# Patient Record
Sex: MALE | URBAN - METROPOLITAN AREA
[De-identification: names, ages, dates, MRNs, and addresses within clinical notes are randomized per-mention and may not be internally consistent; named-entity substitution may affect disease eponyms.]

---

## 2024-01-26 ENCOUNTER — OFFICE VISIT (OUTPATIENT)
Dept: URBAN - METROPOLITAN AREA CLINIC 111 | Facility: CLINIC | Age: 44
End: 2024-01-26
Payer: COMMERCIAL

## 2024-01-26 VITALS
BODY MASS INDEX: 30.76 KG/M2 | HEIGHT: 67 IN | TEMPERATURE: 97.7 F | DIASTOLIC BLOOD PRESSURE: 91 MMHG | WEIGHT: 196 LBS | HEART RATE: 61 BPM | SYSTOLIC BLOOD PRESSURE: 145 MMHG

## 2024-01-26 DIAGNOSIS — R19.5 LOOSE STOOLS: ICD-10-CM

## 2024-01-26 DIAGNOSIS — R19.4 ALTERED BOWEL HABITS: ICD-10-CM

## 2024-01-26 DIAGNOSIS — R15.0 INCOMPLETE DEFECATION: ICD-10-CM

## 2024-01-26 PROCEDURE — 99203 OFFICE O/P NEW LOW 30 MIN: CPT | Performed by: PHYSICIAN ASSISTANT

## 2024-01-26 NOTE — HPI-TODAY'S VISIT:
45 y/o male here w/ diarrhea. Reports wife told him his BMs are not normal.  Pt has a stool twice a day. Stool is usually soft but can be loose or hard. No blood in the stool. He may have one day of diarrhea per week. Watery stool that day and just 1-2 times. He also reports increased gas. Having this intermittent diarrhea for over a year. No increase in frequency in diarrhea. Reports diet is not great and that he eats a lot of fast food. Also drinks soda. Diarrhea may be related to food but he is not tracking what he eats and when the diarrhea starts. He will feel an incomplete sense of evacuation more recently too. He reports increased stress in the last 1.5 years.  No abd pain, cramping, or bloating.  No oil in stool or foul-smelling stool.  No weight loss. No N/V. No FH of GI cancer. Social ETOH. No tobacco. No previous scopes.

## 2024-01-26 NOTE — PHYSICAL EXAM HENT:
normocephalic, atraumatic, Face, Face within normal limits, Ears, External ears within normal limits, Nose/Nasopharynx, External nose  normal appearance, Mouth and Throat, Oral cavity appearance normal, Lips, Appearance normal

## 2024-03-27 ENCOUNTER — LAB (OUTPATIENT)
Dept: URBAN - METROPOLITAN AREA CLINIC 111 | Facility: CLINIC | Age: 44
End: 2024-03-27

## 2024-03-27 ENCOUNTER — OV EP (OUTPATIENT)
Dept: URBAN - METROPOLITAN AREA CLINIC 111 | Facility: CLINIC | Age: 44
End: 2024-03-27
Payer: COMMERCIAL

## 2024-03-27 VITALS
BODY MASS INDEX: 30.67 KG/M2 | WEIGHT: 195.4 LBS | SYSTOLIC BLOOD PRESSURE: 152 MMHG | TEMPERATURE: 98.1 F | HEART RATE: 68 BPM | DIASTOLIC BLOOD PRESSURE: 96 MMHG | HEIGHT: 67 IN

## 2024-03-27 DIAGNOSIS — R19.5 LOOSE STOOLS: ICD-10-CM

## 2024-03-27 DIAGNOSIS — R19.4 ALTERED BOWEL HABITS: ICD-10-CM

## 2024-03-27 PROCEDURE — 99213 OFFICE O/P EST LOW 20 MIN: CPT | Performed by: PHYSICIAN ASSISTANT

## 2024-03-27 NOTE — HPI-TODAY'S VISIT:
43 y/o male here to follow up on changes in bowel habits. Seen by me on 1/26. Recommended starting benefiber and eating healthier. Also recommended food diary.  He states he is feeling similarly. He did try Benefiber but was not consistent with it. When he took it stool was more solid o/w it was still loose. Pt has been having stools 2-3 times a day. No further constipation. No dietary changes. Stool over a year ago was formed.  Previous: Pt has a stool twice a day. Stool is usually soft but can be loose or hard. No blood in the stool. He may have one day of diarrhea per week. Watery stool that day and just 1-2 times. He also reports increased gas. Having this intermittent diarrhea for over a year. No increase in frequency in diarrhea. Reports diet is not great and that he eats a lot of fast food. Also drinks soda. Diarrhea may be related to food but he is not tracking what he eats and when the diarrhea starts.He will feel an incomplete sense of evacuation more recently too.He reports increased stress in the last 1.5 years.No abd pain, cramping, or bloating. No oil in stool or foul-smelling stool. No weight loss. No N/V. No FH of GI cancer. Social ETOH. No tobacco. No previous scopes.

## 2024-04-29 ENCOUNTER — COLON (OUTPATIENT)
Dept: URBAN - METROPOLITAN AREA SURGERY CENTER 15 | Facility: SURGERY CENTER | Age: 44
End: 2024-04-29

## 2024-05-16 ENCOUNTER — TELEPHONE ENCOUNTER (OUTPATIENT)
Dept: URBAN - METROPOLITAN AREA CLINIC 12 | Facility: CLINIC | Age: 44
End: 2024-05-16

## 2024-05-30 ENCOUNTER — DASHBOARD ENCOUNTERS (OUTPATIENT)
Age: 44
End: 2024-05-30

## 2024-05-30 ENCOUNTER — OFFICE VISIT (OUTPATIENT)
Dept: URBAN - METROPOLITAN AREA CLINIC 111 | Facility: CLINIC | Age: 44
End: 2024-05-30
Payer: COMMERCIAL

## 2024-05-30 VITALS
HEART RATE: 78 BPM | BODY MASS INDEX: 31.01 KG/M2 | DIASTOLIC BLOOD PRESSURE: 91 MMHG | WEIGHT: 197.6 LBS | HEIGHT: 67 IN | SYSTOLIC BLOOD PRESSURE: 138 MMHG | TEMPERATURE: 97.9 F

## 2024-05-30 DIAGNOSIS — D12.6 HIGH GRADE DYSPLASIA IN COLONIC ADENOMA: ICD-10-CM

## 2024-05-30 DIAGNOSIS — R19.7 DIARRHEA, UNSPECIFIED: ICD-10-CM

## 2024-05-30 PROCEDURE — 99213 OFFICE O/P EST LOW 20 MIN: CPT | Performed by: PHYSICIAN ASSISTANT

## 2024-06-12 ENCOUNTER — LAB OUTSIDE AN ENCOUNTER (OUTPATIENT)
Dept: URBAN - METROPOLITAN AREA CLINIC 23 | Facility: CLINIC | Age: 44
End: 2024-06-12

## 2024-06-14 ENCOUNTER — TELEPHONE ENCOUNTER (OUTPATIENT)
Dept: URBAN - METROPOLITAN AREA CLINIC 111 | Facility: CLINIC | Age: 44
End: 2024-06-14

## 2024-06-14 LAB — PANCREATICELASTASE ELISA, STOOL: (no result)

## 2024-06-14 RX ORDER — PANCRELIPASE 36000; 180000; 114000 [USP'U]/1; [USP'U]/1; [USP'U]/1
1 CAPSULE WITH EACH MEAL AND 1 WITH SNACKS CAPSULE, DELAYED RELEASE PELLETS ORAL AS DIRECTED
Qty: 120 | Refills: 2 | OUTPATIENT
Start: 2024-06-14 | End: 2024-09-12

## 2024-08-21 ENCOUNTER — OFFICE VISIT (OUTPATIENT)
Dept: URBAN - METROPOLITAN AREA CLINIC 111 | Facility: CLINIC | Age: 44
End: 2024-08-21

## 2024-09-06 ENCOUNTER — TELEPHONE ENCOUNTER (OUTPATIENT)
Dept: URBAN - METROPOLITAN AREA CLINIC 111 | Facility: CLINIC | Age: 44
End: 2024-09-06

## 2024-09-09 ENCOUNTER — OFFICE VISIT (OUTPATIENT)
Dept: URBAN - METROPOLITAN AREA CLINIC 111 | Facility: CLINIC | Age: 44
End: 2024-09-09

## 2024-10-03 ENCOUNTER — OFFICE VISIT (OUTPATIENT)
Dept: URBAN - METROPOLITAN AREA CLINIC 111 | Facility: CLINIC | Age: 44
End: 2024-10-03
Payer: COMMERCIAL

## 2024-10-03 VITALS
TEMPERATURE: 97.5 F | HEIGHT: 67 IN | HEART RATE: 75 BPM | SYSTOLIC BLOOD PRESSURE: 138 MMHG | DIASTOLIC BLOOD PRESSURE: 89 MMHG | BODY MASS INDEX: 30.13 KG/M2 | WEIGHT: 192 LBS

## 2024-10-03 DIAGNOSIS — D12.6 HIGH GRADE DYSPLASIA IN COLONIC ADENOMA: ICD-10-CM

## 2024-10-03 DIAGNOSIS — K86.81 EXOCRINE PANCREATIC INSUFFICIENCY: ICD-10-CM

## 2024-10-03 PROCEDURE — 99214 OFFICE O/P EST MOD 30 MIN: CPT | Performed by: PHYSICIAN ASSISTANT

## 2024-10-03 NOTE — HPI-TODAY'S VISIT:
45 y/o male here to follow up. Seen by me in May for diarrhea and high grade dysplasia colonic adenoma. Fecal elastase was checked and came back very low c/w severe EPI. Pt was put on Creon but c/o having leg pain.   He took Creon for a few weeks and then started having leg pain. He went to the ER and they said his leg was fine. He stopped the Creon and has been back to normal. No other new symptoms. Still having diarrhea. No pancreatic history.  Colonoscopy was done on 4/29 by Dr. Price revealing 2 polyps including one 15 mm rectal polyp and pt had non-bleeding internal hemorrhoids. Path revealing inflammatory polyp and larger polyp was tubular adenoma with focal high-grade dysplasia. Random colon biopsy negative. Repeat colon recommended in 1  year.

## 2024-10-04 ENCOUNTER — LAB OUTSIDE AN ENCOUNTER (OUTPATIENT)
Dept: URBAN - METROPOLITAN AREA CLINIC 111 | Facility: CLINIC | Age: 44
End: 2024-10-04

## 2024-10-04 ENCOUNTER — TELEPHONE ENCOUNTER (OUTPATIENT)
Dept: URBAN - METROPOLITAN AREA CLINIC 111 | Facility: CLINIC | Age: 44
End: 2024-10-04

## 2025-02-13 ENCOUNTER — LAB OUTSIDE AN ENCOUNTER (OUTPATIENT)
Dept: URBAN - METROPOLITAN AREA CLINIC 111 | Facility: CLINIC | Age: 45
End: 2025-02-13

## 2025-02-13 ENCOUNTER — OFFICE VISIT (OUTPATIENT)
Dept: URBAN - METROPOLITAN AREA CLINIC 111 | Facility: CLINIC | Age: 45
End: 2025-02-13
Payer: COMMERCIAL

## 2025-02-13 VITALS
HEIGHT: 67 IN | DIASTOLIC BLOOD PRESSURE: 94 MMHG | SYSTOLIC BLOOD PRESSURE: 143 MMHG | WEIGHT: 192 LBS | TEMPERATURE: 97.9 F | HEART RATE: 80 BPM | BODY MASS INDEX: 30.13 KG/M2

## 2025-02-13 DIAGNOSIS — K86.81 EXOCRINE PANCREATIC INSUFFICIENCY: ICD-10-CM

## 2025-02-13 DIAGNOSIS — D12.6 HIGH GRADE DYSPLASIA IN COLONIC ADENOMA: ICD-10-CM

## 2025-02-13 PROCEDURE — 99214 OFFICE O/P EST MOD 30 MIN: CPT | Performed by: PHYSICIAN ASSISTANT

## 2025-02-13 RX ORDER — PANCRELIPASE LIPASE, PANCRELIPASE AMYLASE, AND PANCRELIPASE PROTEASE 149900; 37000; 97300 [USP'U]/1; [USP'U]/1; [USP'U]/1
2 CAPS BEFORE MEALS & 1 CAP BEFORE SNACKS CAPSULE, DELAYED RELEASE ORAL AS DIRECTED
Qty: 210 CAPSULE | Refills: 11 | OUTPATIENT
Start: 2025-02-13 | End: 2026-02-08

## 2025-02-13 NOTE — HPI-TODAY'S VISIT:
46 y/o male here to follow up on EPI and schedule colonoscopy. Seen by me in 10/2024 for EPI and high grade dysplasia in colonic adenoma. Pt had leg pain on Creon and was given samples of Pancreaze. He had MRI pancreas ordered which revealed a normal pancreas and liver hemangioma.    He had better results with Pancreaze. He did not reach out to get Rx. Still having diarrhea. No rectal bleeding. No pain or new symptoms. He has had elevated BP and working on it with PCP. He may have to start medication for it. Weight is stable. No heart, lung, or kidney problems. No FH of colon cancer.   Previous: Colonoscopy was done on 4/29 by Dr. Price revealing 2 polyps including one 15 mm rectal polyp and pt had non-bleeding internal hemorrhoids. Prep was adequate. Path revealing inflammatory polyp and larger polyp was tubular adenoma with focal high-grade dysplasia. Random colon biopsy negative. Repeat colon recommended in 1  year. Fecal elastase was checked and came back very low c/w severe EPI.

## 2025-02-17 ENCOUNTER — TELEPHONE ENCOUNTER (OUTPATIENT)
Dept: URBAN - METROPOLITAN AREA CLINIC 111 | Facility: CLINIC | Age: 45
End: 2025-02-17

## 2025-03-04 ENCOUNTER — TELEPHONE ENCOUNTER (OUTPATIENT)
Dept: URBAN - METROPOLITAN AREA CLINIC 111 | Facility: CLINIC | Age: 45
End: 2025-03-04

## 2025-03-04 RX ORDER — PANCRELIPASE LIPASE, PANCRELIPASE PROTEASE, PANCRELIPASE AMYLASE 252600; 60000; 189600 [USP'U]/1; [USP'U]/1; [USP'U]/1
2 CAPS WITH MEALS & 1 WITH SNACKS CAPSULE, DELAYED RELEASE ORAL AS DIRECTED
Qty: 210 CAPSULE | Refills: 11 | OUTPATIENT
Start: 2025-03-04 | End: 2026-02-27

## 2025-04-14 ENCOUNTER — OFFICE VISIT (OUTPATIENT)
Dept: URBAN - METROPOLITAN AREA SURGERY CENTER 15 | Facility: SURGERY CENTER | Age: 45
End: 2025-04-14
Payer: COMMERCIAL

## 2025-04-14 DIAGNOSIS — Z86.0100 HISTORY OF COLON POLYPS: ICD-10-CM

## 2025-04-14 DIAGNOSIS — D12.4 BENIGN NEOPLASM OF DESCENDING COLON: ICD-10-CM

## 2025-04-14 DIAGNOSIS — D12.4 ADENOMA OF DESCENDING COLON: ICD-10-CM

## 2025-04-14 DIAGNOSIS — Z86.0100 PERSONAL HISTORY OF COLONIC POLYPS: ICD-10-CM

## 2025-04-14 DIAGNOSIS — Z12.11 COLON CANCER SCREENING: ICD-10-CM

## 2025-04-14 PROCEDURE — 00812 ANES LWR INTST SCR COLSC: CPT | Performed by: NURSE ANESTHETIST, CERTIFIED REGISTERED

## 2025-04-14 PROCEDURE — 45385 COLONOSCOPY W/LESION REMOVAL: CPT | Performed by: INTERNAL MEDICINE

## 2025-04-14 RX ORDER — PANCRELIPASE LIPASE, PANCRELIPASE AMYLASE, AND PANCRELIPASE PROTEASE 149900; 37000; 97300 [USP'U]/1; [USP'U]/1; [USP'U]/1
2 CAPS BEFORE MEALS & 1 CAP BEFORE SNACKS CAPSULE, DELAYED RELEASE ORAL AS DIRECTED
Qty: 210 CAPSULE | Refills: 11 | Status: ACTIVE | COMMUNITY
Start: 2025-02-13 | End: 2026-02-08

## 2025-04-14 RX ORDER — PANCRELIPASE LIPASE, PANCRELIPASE PROTEASE, PANCRELIPASE AMYLASE 252600; 60000; 189600 [USP'U]/1; [USP'U]/1; [USP'U]/1
2 CAPS WITH MEALS & 1 WITH SNACKS CAPSULE, DELAYED RELEASE ORAL AS DIRECTED
Qty: 210 CAPSULE | Refills: 11 | Status: ACTIVE | COMMUNITY
Start: 2025-03-04 | End: 2026-02-27

## 2025-04-28 ENCOUNTER — OFFICE VISIT (OUTPATIENT)
Dept: URBAN - METROPOLITAN AREA CLINIC 111 | Facility: CLINIC | Age: 45
End: 2025-04-28
Payer: COMMERCIAL

## 2025-04-28 DIAGNOSIS — K86.81 EXOCRINE PANCREATIC INSUFFICIENCY: ICD-10-CM

## 2025-04-28 DIAGNOSIS — Z86.0101 HISTORY OF ADENOMATOUS POLYP OF COLON: ICD-10-CM

## 2025-04-28 PROCEDURE — 99213 OFFICE O/P EST LOW 20 MIN: CPT | Performed by: PHYSICIAN ASSISTANT

## 2025-04-28 RX ORDER — PANCRELIPASE LIPASE, PANCRELIPASE PROTEASE, PANCRELIPASE AMYLASE 252600; 60000; 189600 [USP'U]/1; [USP'U]/1; [USP'U]/1
2 CAPS WITH MEALS & 1 WITH SNACKS CAPSULE, DELAYED RELEASE ORAL AS DIRECTED
Qty: 210 CAPSULE | Refills: 11 | Status: ACTIVE | COMMUNITY
Start: 2025-03-04 | End: 2026-02-27

## 2025-04-28 RX ORDER — PANCRELIPASE LIPASE, PANCRELIPASE AMYLASE, AND PANCRELIPASE PROTEASE 149900; 37000; 97300 [USP'U]/1; [USP'U]/1; [USP'U]/1
2 CAPS BEFORE MEALS & 1 CAP BEFORE SNACKS CAPSULE, DELAYED RELEASE ORAL AS DIRECTED
Qty: 210 CAPSULE | Refills: 11 | Status: ON HOLD | COMMUNITY
Start: 2025-02-13 | End: 2026-02-08